# Patient Record
Sex: FEMALE | Race: WHITE | NOT HISPANIC OR LATINO | URBAN - METROPOLITAN AREA
[De-identification: names, ages, dates, MRNs, and addresses within clinical notes are randomized per-mention and may not be internally consistent; named-entity substitution may affect disease eponyms.]

---

## 2020-11-10 ENCOUNTER — EMERGENCY (EMERGENCY)
Facility: HOSPITAL | Age: 40
LOS: 1 days | Discharge: HOME | End: 2020-11-10
Attending: EMERGENCY MEDICINE
Payer: COMMERCIAL

## 2020-11-10 VITALS
DIASTOLIC BLOOD PRESSURE: 98 MMHG | HEART RATE: 84 BPM | RESPIRATION RATE: 20 BRPM | SYSTOLIC BLOOD PRESSURE: 130 MMHG | OXYGEN SATURATION: 100 %

## 2020-11-10 VITALS
HEIGHT: 63 IN | SYSTOLIC BLOOD PRESSURE: 147 MMHG | HEART RATE: 101 BPM | RESPIRATION RATE: 18 BRPM | OXYGEN SATURATION: 99 % | DIASTOLIC BLOOD PRESSURE: 85 MMHG | WEIGHT: 134.92 LBS | TEMPERATURE: 98 F

## 2020-11-10 DIAGNOSIS — Z90.49 ACQUIRED ABSENCE OF OTHER SPECIFIED PARTS OF DIGESTIVE TRACT: Chronic | ICD-10-CM

## 2020-11-10 PROCEDURE — 73080 X-RAY EXAM OF ELBOW: CPT | Mod: 26,RT

## 2020-11-10 PROCEDURE — 99284 EMERGENCY DEPT VISIT MOD MDM: CPT

## 2020-11-10 PROCEDURE — 73060 X-RAY EXAM OF HUMERUS: CPT

## 2020-11-10 PROCEDURE — 73030 X-RAY EXAM OF SHOULDER: CPT | Mod: 26,RT

## 2020-11-10 PROCEDURE — 73030 X-RAY EXAM OF SHOULDER: CPT

## 2020-11-10 PROCEDURE — 73080 X-RAY EXAM OF ELBOW: CPT

## 2020-11-10 PROCEDURE — 73060 X-RAY EXAM OF HUMERUS: CPT | Mod: 26,RT

## 2020-11-10 NOTE — ED ADULT NURSE NOTE - CHPI ED NUR SYMPTOMS NEG
no nausea/no chills/no fever/no tingling/no vomiting/no decreased eating/drinking/no dizziness/no weakness
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
## 2020-11-10 NOTE — ED PROVIDER NOTE - PROGRESS NOTE DETAILS
Patient is reassessed, states feeling better. Patient cleaned the wound initially. Ice to area, no fx on xray. RGUJRAL

## 2020-11-10 NOTE — ED PROVIDER NOTE - NSFOLLOWUPINSTRUCTIONS_ED_ALL_ED_FT
1. Follow up with PMD and orthopedics in 1-2 days. Dr. Trotter [phone]  2. For pain, ibuprofen 400mg every 6hrs as needed for pain with food. Apply ice to the area.  3. If patient develops increasing pain, develops weakness, numbness or tingling or any other concern return to the ER.

## 2020-11-10 NOTE — ED PROVIDER NOTE - PATIENT PORTAL LINK FT
You can access the FollowMyHealth Patient Portal offered by [facility] by registering at the following website: [email or web address]. By joining [facility]’s FollowMyHealth portal, you will also be able to view your health information using other applications (apps) compatible with our system.
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
## 2020-11-10 NOTE — ED PROVIDER NOTE - NS ED ROS FT
GENERAL: no fever, no chills  EYES: no change in vision, no irritation  HEENT: no trouble swallowing or speaking  CARDIAC: no chest pain  PULMONARY: no cough, no shortness of breath, no wheezing  GI: no abdominal pain, no nausea, no vomiting, no diarrhea, no constipation  : no changes in urination  SKIN: no rashes  NEURO: no headache, no numbness, no weakness  MSK: +joint pain, +muscle pain, +neck pain, no back pain, no calf pain     -Uriel Burgess, PGY2

## 2020-11-10 NOTE — ED PROVIDER NOTE - CLINICAL SUMMARY MEDICAL DECISION MAKING FREE TEXT BOX
Uriel Burgess, PGY2: 40 year old female p/w R shoulder/elbow pain s/p mechanical fall yesterday. No head strike or LOC. R elbow ecchymosis w/ ttp and superficial abrasion. Tetanus UTD. Plan for XR r/o fx. Offered and declined pain management. If no fx, sling and ortho follow up.

## 2020-11-10 NOTE — ED PROVIDER NOTE - PHYSICAL EXAMINATION
GENERAL: A&Ox3, non-toxic appearing, no acute distress  HEENT: NCAT, EOMI, oral mucosa moist, normal conjunctiva  RESP: no respiratory distress, speaking in full sentences  MSK: R elbow ecchymosis and ttp, small abrasion to posterior elbow, limited R elbow extension, R shoulder w/o bony tenderness, R paracervical ttp, no scapular ttp  NEURO: no focal sensory or motor deficits, SILT, AIN/PIN/U/R nerves intact  SKIN: warm, normal color, well perfused, no rash  PSYCH: normal affect    -Uriel Burgess, PGY2
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
## 2021-09-01 NOTE — ED ADULT NURSE NOTE - NS ED NURSE RECORD ANOTHER HT AND WT
Ro Conti presents to [facility].    Chief Complaint:  Gynecologic Exam         History of Present Illness:  GYN/WWE  Current contraceptive: menopasual  Last pap smear: normal 4-11-19  BSE: occ / no masses   Last mammogram: 8-4-21 normal   Sexually active : rarely   G 1 P1  Use Tobacco occ / socially   Colon screening: 3-2016 was due colon screen 3-2021    Diabetes:  Current medication Metformin/exercises, works on diet   Tolerating medication: Yes  Last eye exam 8-2021, [facility]any   Last foot exam: 3-2021  At home BS ranges: not sugars  Lab Results       Component                Value               Date                       HGBA1C                   7.1 (H)             05/07/2021              Hypertension:  Current medication: tenormin and HCTZ  Tolerating Medication: Yes  Checking BP at home and it is : not checking   Needs refills: Yes, oscar   Labs   Lab Results       Component                Value               Date                       BUN                      10                  05/07/2021                 CREATININE               0.68                05/07/2021                 BCR                      15                  05/07/2021                 K                        4.2                 05/07/2021                 CO2                      27                  05/07/2021                 CALCIUM                  9.8                 05/07/2021                 ALBUMIN                  4.5                 05/07/2021                 LABIL2                   1.7                 05/07/2021                 AST                      42                  05/07/2021                 ALT                      35                  05/07/2021                        Review of Systems:  Review of Systems   Constitutional: Negative for fatigue.   HENT: Negative for congestion and sore throat.    Eyes: Negative for blurred vision.   Respiratory: Negative for cough and shortness  of breath.    Cardiovascular: Negative for chest pain, palpitations and leg swelling.   Gastrointestinal: Negative for abdominal pain and blood in stool.   Genitourinary: Negative for breast lump, dysuria and menstrual problem.   Musculoskeletal: Negative for arthralgias.   Skin: Negative for rash.   Neurological: Negative for headache.   Psychiatric/Behavioral: Negative for depressed mood.          Vital Signs:   /58 (BP Location: Right arm, Patient Position: Sitting, Cuff Size: Large Adult)   Pulse 58   Wt 112 kg (247 lb)   BMI 44.64 kg/m²       Physical Exam:  Physical Exam  Vitals reviewed.   Constitutional:       General: She is not in acute distress.     Appearance: She is well-developed. She is obese.   HENT:      Right Ear: Hearing and tympanic membrane normal.      Left Ear: Hearing and tympanic membrane normal.      Mouth/Throat:      Pharynx: Oropharynx is clear. No posterior oropharyngeal erythema.   Eyes:      General: Lids are normal.      Conjunctiva/sclera: Conjunctivae normal.      Pupils: Pupils are equal, round, and reactive to light.   Neck:      Thyroid: No thyroid mass or thyromegaly.   Cardiovascular:      Rate and Rhythm: Normal rate and regular rhythm.      Heart sounds: Normal heart sounds.   Pulmonary:      Effort: Pulmonary effort is normal. No respiratory distress.      Breath sounds: Normal breath sounds.   Chest:      Breasts: Breasts are symmetrical.         Right: Normal. No mass, nipple discharge, skin change or tenderness.         Left: Normal. No mass, nipple discharge, skin change or tenderness.   Abdominal:      General: Bowel sounds are normal.      Palpations: Abdomen is soft. There is no mass.      Tenderness: There is no abdominal tenderness.   Genitourinary:     General: Normal vulva.      Vagina: Normal. No vaginal discharge or lesions.      Cervix: Normal.      Uterus: Normal.       Adnexa: Right adnexa normal and left adnexa normal.      Rectum: Normal. Guaiac  result negative. No mass.   Musculoskeletal:      Right lower leg: No edema.      Left lower leg: No edema.      Comments: Normal tone strength   Lymphadenopathy:      Cervical: No cervical adenopathy.      Upper Body:      Right upper body: No axillary adenopathy.      Left upper body: No axillary adenopathy.   Skin:     General: Skin is warm and dry.      Findings: No lesion or rash.   Neurological:      Mental Status: She is alert and oriented to person, place, and time.      Cranial Nerves: No cranial nerve deficit.      Motor: No abnormal muscle tone.      Deep Tendon Reflexes: Reflexes are normal and symmetric.      Reflex Scores:       Patellar reflexes are 2+ on the right side and 2+ on the left side.  Psychiatric:         Attention and Perception: Attention normal.         Mood and Affect: Mood normal.         Behavior: Behavior normal.         Result Review      The following data was reviewed by: JEROME Villela on 09/01/2021:    Results for orders placed or performed in visit on 08/19/21    DEXA SCAN   Result Value Ref Range     Dexa Scan SEE REPORT     PAP SMEAR   Result Value Ref Range     Pap smear SEE REPORT     DIABETES EYE EXAM   Result Value Ref Range     EYE EXAM SEE REPORT     COLONOSCOPY   Result Value Ref Range     Colonoscopy SEE REPORT                Assessment and Plan:          Diagnoses and all orders for this visit:    1. Encounter for screening mammogram for malignant neoplasm of breast (Primary)  -     Cancel: Mammo Screening Digital Tomosynthesis Bilateral With CAD; Future    2. Family history of colon cancer  Assessment & Plan:  Will refer her back to Dr Ca per her request, her hemoccult was negative today X 1     Orders:  -     Ambulatory Referral to General Surgery    3. Encounter for annual routine gynecological examination  Assessment & Plan:  She is UTD on mammogram / recommend monthly BSE     Orders:  -     PAP, Liquid Based ([facility] Only)    4.  Type 2 diabetes mellitus without complication, without long-term current use of insulin (CMS/[facility])  Assessment & Plan:  Advise her to work on diet, regular exercise, weight loss, start checking sugars, getting labs in , reviewed last labs           Follow Up   Return for fasting for labs in , diabetes 1 panel .  Patient was given instructions and counseling regarding her condition or for health maintenance advice. Please see specific information pulled into the AVS if appropriate.        Yes